# Patient Record
Sex: FEMALE | Race: WHITE | NOT HISPANIC OR LATINO | URBAN - METROPOLITAN AREA
[De-identification: names, ages, dates, MRNs, and addresses within clinical notes are randomized per-mention and may not be internally consistent; named-entity substitution may affect disease eponyms.]

---

## 2022-02-23 ENCOUNTER — OUTPATIENT (OUTPATIENT)
Dept: OUTPATIENT SERVICES | Facility: HOSPITAL | Age: 52
LOS: 1 days | End: 2022-02-23
Payer: MEDICAID

## 2022-02-23 VITALS
HEIGHT: 63 IN | DIASTOLIC BLOOD PRESSURE: 77 MMHG | RESPIRATION RATE: 17 BRPM | HEART RATE: 63 BPM | SYSTOLIC BLOOD PRESSURE: 125 MMHG | TEMPERATURE: 99 F | WEIGHT: 225.09 LBS | OXYGEN SATURATION: 99 %

## 2022-02-23 DIAGNOSIS — Z29.9 ENCOUNTER FOR PROPHYLACTIC MEASURES, UNSPECIFIED: ICD-10-CM

## 2022-02-23 DIAGNOSIS — Z01.818 ENCOUNTER FOR OTHER PREPROCEDURAL EXAMINATION: ICD-10-CM

## 2022-02-23 DIAGNOSIS — E66.9 OBESITY, UNSPECIFIED: ICD-10-CM

## 2022-02-23 DIAGNOSIS — N75.0 CYST OF BARTHOLIN'S GLAND: ICD-10-CM

## 2022-02-23 DIAGNOSIS — G47.33 OBSTRUCTIVE SLEEP APNEA (ADULT) (PEDIATRIC): ICD-10-CM

## 2022-02-23 DIAGNOSIS — Z98.84 BARIATRIC SURGERY STATUS: Chronic | ICD-10-CM

## 2022-02-23 DIAGNOSIS — Z90.710 ACQUIRED ABSENCE OF BOTH CERVIX AND UTERUS: Chronic | ICD-10-CM

## 2022-02-23 DIAGNOSIS — Z98.890 OTHER SPECIFIED POSTPROCEDURAL STATES: Chronic | ICD-10-CM

## 2022-02-23 LAB — BLD GP AB SCN SERPL QL: SIGNIFICANT CHANGE UP

## 2022-02-23 PROCEDURE — 71046 X-RAY EXAM CHEST 2 VIEWS: CPT

## 2022-02-23 PROCEDURE — 71046 X-RAY EXAM CHEST 2 VIEWS: CPT | Mod: 26

## 2022-02-23 PROCEDURE — G0463: CPT

## 2022-02-23 NOTE — H&P PST ADULT - PROBLEM SELECTOR PLAN 4
Pt encourage to continue to reduce calories since her gastric bypass and incorporate more exercise in her daily routine

## 2022-02-23 NOTE — H&P PST ADULT - NSICDXFAMILYHX_GEN_ALL_CORE_FT
FAMILY HISTORY:  Father  Still living? No  Family history of diabetes mellitus (DM), Age at diagnosis: Age Unknown    Mother  Still living? No  FH: HTN (hypertension), Age at diagnosis: Age Unknown

## 2022-02-23 NOTE — H&P PST ADULT - HISTORY OF PRESENT ILLNESS
52 yr old female obese hx of gastric bypass (April2021) 52 yr old female obese hx of gastric bypass (April2021) no longer has high blood pressure, dm, or sleep apnea, pt has overactive bladder on oxybutynin and Paroxetine for menopausal symptoms. Pt discovered about one month ago a large cyst the size of an egg she describes and seen by Dr. Bejarano. Pt is schedule for marsupialization of left Bartholin cyst on  3/1/2022.

## 2022-02-23 NOTE — H&P PST ADULT - NSICDXPASTMEDICALHX_GEN_ALL_CORE_FT
PAST MEDICAL HISTORY:  Cyst of Bartholin's gland     Overactive bladder      PAST MEDICAL HISTORY:  Cyst of Bartholin's gland     Obesity (BMI 30-39.9)     Overactive bladder

## 2022-02-23 NOTE — H&P PST ADULT - FALL HARM RISK - UNIVERSAL INTERVENTIONS
Bed in lowest position, wheels locked, appropriate side rails in place/Call bell, personal items and telephone in reach/Instruct patient to call for assistance before getting out of bed or chair/Non-slip footwear when patient is out of bed/[place] to call system/Physically safe environment - no spills, clutter or unnecessary equipment/Purposeful Proactive Rounding/Room/bathroom lighting operational, light cord in reach

## 2022-02-23 NOTE — H&P PST ADULT - PROBLEM SELECTOR PLAN 2
Pt given written and verbal instructions for 4% chlorhexidene wash am of surgery 3/1/2022. Pt verbalized understanding and aware of NPO after midnight on 2/28/2022.

## 2022-02-23 NOTE — H&P PST ADULT - ASSESSMENT
52 yr old female obese hx of gastric bypass (April2021) no longer has high blood pressure, dm, or sleep apnea, pt has overactive bladder on oxybutynin and Paroxetine for menopausal symptoms. Pt discovered about one month ago a large cyst the size of an egg she describes, difficult for sitting too long. . Pt is schedule for marsupialization of left Bartholin cyst on  3/1/2022.

## 2022-03-01 ENCOUNTER — OUTPATIENT (OUTPATIENT)
Dept: OUTPATIENT SERVICES | Facility: HOSPITAL | Age: 52
LOS: 1 days | End: 2022-03-01
Payer: MEDICAID

## 2022-03-01 VITALS
WEIGHT: 225.09 LBS | OXYGEN SATURATION: 98 % | RESPIRATION RATE: 16 BRPM | HEIGHT: 63 IN | TEMPERATURE: 98 F | SYSTOLIC BLOOD PRESSURE: 119 MMHG | DIASTOLIC BLOOD PRESSURE: 63 MMHG | HEART RATE: 62 BPM

## 2022-03-01 VITALS
HEART RATE: 75 BPM | OXYGEN SATURATION: 96 % | DIASTOLIC BLOOD PRESSURE: 75 MMHG | TEMPERATURE: 98 F | RESPIRATION RATE: 16 BRPM | SYSTOLIC BLOOD PRESSURE: 115 MMHG

## 2022-03-01 DIAGNOSIS — Z98.84 BARIATRIC SURGERY STATUS: Chronic | ICD-10-CM

## 2022-03-01 DIAGNOSIS — Z90.710 ACQUIRED ABSENCE OF BOTH CERVIX AND UTERUS: Chronic | ICD-10-CM

## 2022-03-01 DIAGNOSIS — Z98.890 OTHER SPECIFIED POSTPROCEDURAL STATES: Chronic | ICD-10-CM

## 2022-03-01 DIAGNOSIS — N75.0 CYST OF BARTHOLIN'S GLAND: ICD-10-CM

## 2022-03-01 LAB — BLD GP AB SCN SERPL QL: SIGNIFICANT CHANGE UP

## 2022-03-01 PROCEDURE — 88304 TISSUE EXAM BY PATHOLOGIST: CPT

## 2022-03-01 PROCEDURE — 57135 EXCISION VAGINAL CYST/TUMOR: CPT

## 2022-03-01 PROCEDURE — 86901 BLOOD TYPING SEROLOGIC RH(D): CPT

## 2022-03-01 PROCEDURE — 88304 TISSUE EXAM BY PATHOLOGIST: CPT | Mod: 26

## 2022-03-01 PROCEDURE — 56440 MRSPLZATN BRTHLNS GLND CST: CPT

## 2022-03-01 PROCEDURE — 86850 RBC ANTIBODY SCREEN: CPT

## 2022-03-01 PROCEDURE — 86900 BLOOD TYPING SEROLOGIC ABO: CPT

## 2022-03-01 PROCEDURE — 36415 COLL VENOUS BLD VENIPUNCTURE: CPT

## 2022-03-01 RX ORDER — SODIUM CHLORIDE 9 MG/ML
3 INJECTION INTRAMUSCULAR; INTRAVENOUS; SUBCUTANEOUS EVERY 8 HOURS
Refills: 0 | Status: DISCONTINUED | OUTPATIENT
Start: 2022-03-01 | End: 2022-03-01

## 2022-03-01 RX ORDER — OXYBUTYNIN CHLORIDE 5 MG
0 TABLET ORAL
Qty: 0 | Refills: 0 | DISCHARGE

## 2022-03-01 RX ORDER — FENTANYL CITRATE 50 UG/ML
25 INJECTION INTRAVENOUS
Refills: 0 | Status: DISCONTINUED | OUTPATIENT
Start: 2022-03-01 | End: 2022-03-01

## 2022-03-01 RX ORDER — IBUPROFEN 200 MG
1 TABLET ORAL
Qty: 120 | Refills: 0
Start: 2022-03-01 | End: 2022-03-30

## 2022-03-01 RX ORDER — PREGABALIN 225 MG/1
1 CAPSULE ORAL
Qty: 0 | Refills: 0 | DISCHARGE

## 2022-03-01 RX ORDER — IBUPROFEN 200 MG
600 TABLET ORAL EVERY 6 HOURS
Refills: 0 | Status: DISCONTINUED | OUTPATIENT
Start: 2022-03-01 | End: 2022-03-08

## 2022-03-01 NOTE — ASU DISCHARGE PLAN (ADULT/PEDIATRIC) - NS MD DC FALL RISK RISK
For information on Fall & Injury Prevention, visit: [email or web address] OR  [email or web address] OR  https://www.cdc.gov/steadi/patient.html

## 2022-03-01 NOTE — ASU DISCHARGE PLAN (ADULT/PEDIATRIC) - CARE PROVIDER_API CALL
Manny Bejarano)  Obstetrics and Gynecology  [address] [place] [address]  Phone: [phone]  Fax: [phone]  Follow Up Time:

## 2022-03-01 NOTE — ASU PATIENT PROFILE, ADULT - NSICDXPASTSURGICALHX_GEN_ALL_CORE_FT
PAST SURGICAL HISTORY:  H/O gastric bypass april 2021    History of carpal tunnel surgery of right wrist     S/P complete hysterectomy uterine cancer

## 2022-03-01 NOTE — ASU PATIENT PROFILE, ADULT - NSICDXPASTMEDICALHX_GEN_ALL_CORE_FT
PAST MEDICAL HISTORY:  Cyst of Bartholin's gland     Obesity (BMI 30-39.9)     Overactive bladder

## 2022-03-01 NOTE — ASU PATIENT PROFILE, ADULT - FALL HARM RISK - UNIVERSAL INTERVENTIONS
Bed in lowest position, wheels locked, appropriate side rails in place/Call bell, personal items and telephone in reach/Instruct patient to call for assistance before getting out of bed or chair/Non-slip footwear when patient is out of bed/[place] to call system/Physically safe environment - no spills, clutter or unnecessary equipment/Purposeful Proactive Rounding/Room/bathroom lighting operational, light cord in reach

## 2022-03-04 LAB — SURGICAL PATHOLOGY STUDY: SIGNIFICANT CHANGE UP

## (undated) DEVICE — POSITIONER STRAP ARMBOARD VELCRO TS-30 12

## (undated) DEVICE — PREP BETADINE SPONGE STICKS

## (undated) DEVICE — SUT CHROMIC 3-0 27" SH

## (undated) DEVICE — SUT VICRYL 2-0 27" SH UNDYED

## (undated) DEVICE — PROTECTOR HEEL / ELBOW FLUFFY

## (undated) DEVICE — GLV 7.5 PROTEXIS

## (undated) DEVICE — FOR-ESU VALLEYLAB T7E14830DX: Type: DURABLE MEDICAL EQUIPMENT

## (undated) DEVICE — SET BASIN SINGLE STERILE

## (undated) DEVICE — DRAPE TOWEL BLUE 17" X 24"

## (undated) DEVICE — ELCTR EDGE BOVIE INSULATED W SAFETY BLADE 2.8"

## (undated) DEVICE — WRAP COMPRESSION CALF MED

## (undated) DEVICE — PACK PERI GYN

## (undated) DEVICE — SUCTION YANKAUER NO CONTROL VENT

## (undated) DEVICE — BLANKET WARMER FULL ADULT

## (undated) DEVICE — DRSG PAD SANITARY OB

## (undated) DEVICE — SOL IRR POUR H2O 500ML

## (undated) DEVICE — PACK MINOR NO DRAPE